# Patient Record
Sex: MALE | Race: WHITE | NOT HISPANIC OR LATINO | Employment: OTHER | URBAN - METROPOLITAN AREA
[De-identification: names, ages, dates, MRNs, and addresses within clinical notes are randomized per-mention and may not be internally consistent; named-entity substitution may affect disease eponyms.]

---

## 2023-11-28 ENCOUNTER — OFFICE VISIT (OUTPATIENT)
Dept: NEUROSURGERY | Facility: CLINIC | Age: 83
End: 2023-11-28
Payer: MEDICARE

## 2023-11-28 VITALS
HEIGHT: 65 IN | BODY MASS INDEX: 25.49 KG/M2 | SYSTOLIC BLOOD PRESSURE: 151 MMHG | DIASTOLIC BLOOD PRESSURE: 79 MMHG | WEIGHT: 153 LBS

## 2023-11-28 DIAGNOSIS — D49.6 BRAIN TUMOR: Primary | ICD-10-CM

## 2023-11-28 RX ORDER — LISINOPRIL 2.5 MG/1
2.5 TABLET ORAL DAILY
COMMUNITY
Start: 2023-06-21

## 2023-11-28 RX ORDER — CHLORAL HYDRATE 500 MG
1000 CAPSULE ORAL
COMMUNITY

## 2023-11-28 RX ORDER — DONEPEZIL HYDROCHLORIDE 10 MG/1
10 TABLET, FILM COATED ORAL NIGHTLY
COMMUNITY
Start: 2023-11-13

## 2023-11-28 RX ORDER — ALBUTEROL SULFATE 90 UG/1
2 AEROSOL, METERED RESPIRATORY (INHALATION)
COMMUNITY

## 2023-11-28 RX ORDER — ATORVASTATIN CALCIUM 80 MG/1
80 TABLET, FILM COATED ORAL NIGHTLY
COMMUNITY

## 2023-11-28 RX ORDER — ASPIRIN 81 MG/1
81 TABLET ORAL DAILY
COMMUNITY

## 2023-11-28 RX ORDER — MELATONIN
1000 DAILY
COMMUNITY

## 2023-11-28 NOTE — PROGRESS NOTES
"Chief Complaint  Brain cyst    Subjective          Alex Henderson who is a 82 y.o. year old male who presents to [facility] for Brain Tumor.  Patient presented with  memory issues  for 2 years.  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    Associated Symptoms Include: Poor memory  Previous Treatments Include: Aricept    History of Previous Cancer?: Yes, skin (basal cell)  Family History of Brain Cancer?: no    The Patient is Right handed.    No recent trauma.    Review of Systems   Neurological:  Positive for memory problem.        Objective   Vital Signs:   /79 (BP Location: Left arm, Patient Position: Sitting)   Ht 165.1 cm (65\")   Wt 69.4 kg (153 lb)   BMI 25.46 kg/m²       Physical Exam  Constitutional:       Appearance: He is normal weight.   Neurological:      Mental Status: He is alert.      Motor: No weakness (no drift).      Comments: No drift  Nl FTN  Naming 3/3   Psychiatric:         Mood and Affect: Mood normal.          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        Assessment and Plan    Diagnoses and all orders for this visit:    1. Brain tumor-left marine (Primary)    He has a benign appearing lesion on the left marine. Removing this would not help his memory loss. He will monitor for any new symptoms such as headache or weakness.     He will consider repeat imaging with new symptoms.     He may f/u with [facility].    Follow Up   No follow-ups on file.  Patient was given instructions and counseling regarding his condition or for health maintenance advice. Please see specific information pulled into the AVS if appropriate.       "